# Patient Record
Sex: MALE | Race: WHITE | NOT HISPANIC OR LATINO | Employment: FULL TIME | URBAN - NONMETROPOLITAN AREA
[De-identification: names, ages, dates, MRNs, and addresses within clinical notes are randomized per-mention and may not be internally consistent; named-entity substitution may affect disease eponyms.]

---

## 2021-12-27 PROCEDURE — U0004 COV-19 TEST NON-CDC HGH THRU: HCPCS | Performed by: NURSE PRACTITIONER

## 2023-08-30 ENCOUNTER — OFFICE VISIT (OUTPATIENT)
Dept: INTERNAL MEDICINE | Facility: CLINIC | Age: 28
End: 2023-08-30
Payer: COMMERCIAL

## 2023-08-30 VITALS
HEIGHT: 66 IN | HEART RATE: 89 BPM | WEIGHT: 183 LBS | TEMPERATURE: 98.4 F | DIASTOLIC BLOOD PRESSURE: 68 MMHG | SYSTOLIC BLOOD PRESSURE: 112 MMHG | OXYGEN SATURATION: 100 % | BODY MASS INDEX: 29.41 KG/M2

## 2023-08-30 DIAGNOSIS — Z00.00 WELL ADULT EXAM: Primary | ICD-10-CM

## 2023-08-30 PROCEDURE — 99385 PREV VISIT NEW AGE 18-39: CPT | Performed by: FAMILY MEDICINE

## 2023-08-30 NOTE — PROGRESS NOTES
Michael Kennedy is a 27 y.o. male.    Chief Complaint   Patient presents with    Annual Exam     New patient - physical -        HPI   Michael Kennedy is a 27-year-old male who presents today for an annual physical exam and to establish care.     The patient believes he has received a tetanus vaccine within the last 10 years.  He has received the initial and follow-up COVID-19 injections. They were received on 06/2023 and 07/2023 because of his family reunion. He typically does not receive the influenza vaccine.  Once a year he may have a cigar for special occasions like he did last year after his wedding. He avoids drinking during the week and occasionally drinks on the weekend. 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 Eye exam was completed by a doctor in [place], vision was good, and he did not need any glasses. Dental exam was completed a couple of months ago. He tries to maintain a healthy diet. 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     The following portions of the patient's history were reviewed and updated as appropriate: allergies, current medications, past family history, past medical history, past social history, past surgical history and problem list.     History reviewed. No pertinent past medical history.    History reviewed. No pertinent surgical history.    Family History   Problem Relation Age of Onset    Cancer Mother         Follicular Lymphoma    Depression Mother     Cancer Father         Stomach Stage IV    Depression Sister     Lung cancer Maternal Grandmother        Social History     Socioeconomic History    Marital status:    Tobacco Use    Smoking status: Never     Passive exposure: Yes    Smokeless tobacco: Never    Tobacco comments:     Only one of two per year   Vaping Use    Vaping Use: Never used   Substance and Sexual Activity  "   Alcohol use: Yes     Alcohol/week: 15.0 standard drinks     Types: 3 Glasses of wine, 12 Drinks containing 0.5 oz of alcohol per week    Drug use: Never    Sexual activity: Yes     Partners: Female       No Known Allergies    No current outpatient medications on file.    ROS    Review of Systems   Constitutional:  Negative for chills, fatigue and fever.   HENT:  Negative for congestion, postnasal drip and sore throat.    Eyes:  Negative for blurred vision and visual disturbance.   Respiratory:  Negative for cough, shortness of breath and wheezing.    Cardiovascular:  Negative for chest pain and leg swelling.   Gastrointestinal:  Negative for abdominal pain, constipation, diarrhea, nausea and vomiting.   Endocrine: Negative for cold intolerance and heat intolerance.   Genitourinary:  Negative for dysuria and frequency.   Musculoskeletal:  Negative for arthralgias and back pain.   Skin:  Negative for color change and rash.   Allergic/Immunologic: Negative for environmental allergies.   Neurological:  Negative for weakness, numbness and headache.   Hematological:  Does not bruise/bleed easily.   Psychiatric/Behavioral:  Negative for depressed mood. The patient is not nervous/anxious.      Vitals:    08/30/23 0909   BP: 112/68   Pulse: 89   Temp: 98.4 øF (36.9 øC)   TempSrc: Oral   SpO2: 100%   Weight: 83 kg (183 lb)   Height: 167.6 cm (66\")   PainSc: 0-No pain     Body mass index is 29.54 kg/mý.    Physical Exam     Physical Exam  Constitutional:       General: He is not in acute distress.     Appearance: Normal appearance. He is well-developed.   HENT:      Head: Normocephalic and atraumatic.      Right Ear: Tympanic membrane and external ear normal.      Left Ear: Tympanic membrane and external ear normal.      Nose: Nose normal.      Mouth/Throat:      Pharynx: No posterior oropharyngeal erythema.      Comments: Postnasal drainage noted.  Eyes:      Extraocular Movements: Extraocular movements intact.      " Conjunctiva/sclera: Conjunctivae normal.      Pupils: Pupils are equal, round, and reactive to light.   Cardiovascular:      Rate and Rhythm: Normal rate and regular rhythm.      Heart sounds: No murmur heard.  Pulmonary:      Effort: Pulmonary effort is normal. No respiratory distress.      Breath sounds: Normal breath sounds. No wheezing.   Abdominal:      General: Bowel sounds are normal. There is no distension.      Palpations: Abdomen is soft.      Tenderness: There is no abdominal tenderness.   Musculoskeletal:      Cervical back: Neck supple.      Right lower leg: No edema.      Left lower leg: No edema.   Lymphadenopathy:      Cervical: No cervical adenopathy.   Skin:     General: Skin is warm and dry.   Neurological:      Mental Status: He is alert and oriented to person, place, and time.      Cranial Nerves: No cranial nerve deficit.   Psychiatric:         Mood and Affect: Mood normal.         Behavior: Behavior normal.       Assessment/Plan    Diagnoses and all orders for this visit:    1. Well adult exam (Primary)  Assessment & Plan:  Discussed with the patient routine health maintenance including vaccines, dental/eye health, healthy diet, and exercise. He reports receiving a tetanus vaccine within the last 10 years. We will try to obtain records of vaccines from his previous PCP. He is not due for any labs currently. Mental health has been addressed today as well.          No orders of the defined types were placed in this encounter.      No orders of the defined types were placed in this encounter.      Return in about 1 year (around 8/30/2024) for Annual.      Xena Denny DO  Transcribed from ambient dictation for Xena Denny DO by [place].  08/30/23   10:25 EDT    Patient or patient representative verbalized consent to the visit recording.  I have personally performed the services described in this document as transcribed by the above individual, and it is both accurate and  complete.

## 2023-08-30 NOTE — ASSESSMENT & PLAN NOTE
Discussed with the patient routine health maintenance including vaccines, dental/eye health, healthy diet, and exercise. He reports receiving a tetanus vaccine within the last 10 years. We will try to obtain records of vaccines from his previous PCP. He is not due for any labs currently. Mental health has been addressed today as well.

## 2023-12-13 ENCOUNTER — TRANSCRIBE ORDERS (OUTPATIENT)
Dept: PHYSICAL THERAPY | Facility: CLINIC | Age: 28
End: 2023-12-13
Payer: COMMERCIAL

## 2023-12-13 DIAGNOSIS — M25.571 PAIN IN RIGHT ANKLE AND JOINTS OF RIGHT FOOT: ICD-10-CM
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
## 2023-12-19 ENCOUNTER — TREATMENT (OUTPATIENT)
Dept: PHYSICAL THERAPY | Facility: CLINIC | Age: 28
End: 2023-12-19
Payer: OTHER MISCELLANEOUS

## 2023-12-19 DIAGNOSIS — M25.572 CHRONIC PAIN OF LEFT ANKLE: Primary | ICD-10-CM

## 2023-12-19 DIAGNOSIS — G89.29 CHRONIC PAIN OF LEFT ANKLE: Primary | ICD-10-CM

## 2023-12-19 DIAGNOSIS — S93.401D SPRAIN OF RIGHT ANKLE, UNSPECIFIED LIGAMENT, SUBSEQUENT ENCOUNTER: ICD-10-CM

## 2023-12-19 PROCEDURE — 97161 PT EVAL LOW COMPLEX 20 MIN: CPT | Performed by: PHYSICAL THERAPIST

## 2023-12-19 NOTE — PROGRESS NOTES
Physical Therapy Initial Evaluation and Plan of Care   [address] [place] [address]      Patient: Michael Kennedy   : 1995  Diagnosis/ICD-10 Code:  Chronic pain of left ankle [M25.572, G89.29]  Referring practitioner: Costa Hanley Jr.*    Subjective Evaluation    History of Present Illness  Date of onset: 2023  Mechanism of injury: Pt reports that he slipped and fell at work twisting the R ankle. Pt reports that he has been having pain in the ankle since and is working light duty. Pt reports that he has been having difficulty walking and turning the ankle to the left. Pt reports having an inversion ankle sprain. Pt reports that he has trouble pushing off the R ankle and turning the ankle inwards. Pt reports that he could only walk on his toes last week and has slowly improved.       Patient Occupation:  Pain  Current pain ratin  At worst pain ratin  Quality: sharp and dull ache  Relieving factors: support, medications and ice  Aggravating factors: ambulation, squatting, stairs and movement (running, turning,)  Progression: improved    Social Support  Lives in: multiple-level home    Hand dominance: right    Diagnostic Tests  X-ray: normal    Treatments  Previous treatment: medication  Patient Goals  Patient goals for therapy: decreased pain, increased motion and increased strength  Patient goal: Return to normal work activities.           Objective          Active Range of Motion   Left Ankle/Foot   Dorsiflexion (ke): 0 degrees   Plantar flexion: 65 degrees   Inversion: 38 degrees   Eversion: 14 degrees     Right Ankle/Foot   Plantar flexion: 44 degrees   Inversion: 19 degrees   Eversion: 10 degrees     Additional Active Range of Motion Details  10 degrees from neutral on R ankle    Swelling   Left Ankle/Foot   Figure 8: 54 cm    Right Ankle/Foot   Figure 8: 52 cm    Ambulation     Observational Gait   Gait: antalgic   Decreased walking speed and  stride length.   Right foot contact pattern: foot flat    Quality of Movement During Gait     Additional Quality of Movement During Gait Details  Pt ambulates with R hip externally rotated and has ankle ER to avoid heel strike and toe off.           Assessment & Plan       Assessment  Impairments: abnormal gait, abnormal muscle tone, abnormal or restricted ROM, activity intolerance, impaired balance, impaired physical strength, lacks appropriate home exercise program, pain with function and weight-bearing intolerance   Functional limitations: walking, uncomfortable because of pain, standing and stooping   Assessment details: Patient is a 28 year old male who comes to physical therapy with R chronic ankle pain. Signs and symptoms are consistent with R ankle sprain of the lateral ligaments resulting in pain, decreased ROM, decreased strength, and inability to perform all essential functional activities. Pt will benefit from skilled PT services to address the above issues.       Prognosis: good    Goals  Plan Goals: SHORT TERM GOALS:     2 weeks  1. Pt independent with HEP   2. Pt to demonstrate ability to ambulate in the clinic without antalgic gait   3. Pt to demonstrate ability to perform bilateral leg heel raise on the right without increase in pain      LONG TERM GOALS:   6 weeks  1. Pt to demonstrate ability to perform full functional squat with good form and no increase in pain in the right foot/ankle  2. Pt to demonstrate ability to ambulate on TM for 10 minutes with normal gait pattern without increase in pain  3. Pt to report being able to work full shift without increase in pain in the right ankle/foot  4. Pt to demonstrate ability to perform SLS on the right lower extremity for 30 seconds without LOB or increased pain         Plan  Therapy options: will be seen for skilled therapy services  Planned modality interventions: cryotherapy  Planned therapy interventions: balance/weight-bearing training, body  mechanics training, flexibility, functional ROM exercises, home exercise program, joint mobilization, gait training, manual therapy, neuromuscular re-education, motor coordination training, soft tissue mobilization, strengthening, stretching and therapeutic activities  Frequency: 2x week  Duration in weeks: 6  Treatment plan discussed with: patient        Timed Treatment:  Manual Therapy:         mins  28179;  Therapeutic Exercise:         mins  96830;     Neuromuscular Tyrell:        mins  11093;    Therapeutic Activity:          mins  15320;     Gait Training:           mins  11598;     Ultrasound:          mins  91048;    Electrical Stimulation:         mins  44805 ( );  Dry Needling          mins self-pay  Iontophoresis          mins 64956    Untimed Treatments:  Electrical Stimulation:         mins  41659 ( );  Dry Needling:                     mins  Ultrasound:                         mins  68126;      Timed Treatment:      mins   Total Treatment:     37   mins    PT SIGNATURE: Jose Roman PT   [place] License: [id number]  DATE TREATMENT INITIATED: 12/19/2023    Initial Certification  Certification Period: 3/17/2024  I certify that the therapy services are furnished while this patient is under my care.  The services outlined above are required by this patient, and will be reviewed every 90 days.     PHYSICIAN: Costa Hanley Jr., APRN      DATE:     Please sign and return via fax to [phone].. Thank you, [facility].

## 2024-01-03 ENCOUNTER — TELEPHONE (OUTPATIENT)
Dept: INTERNAL MEDICINE | Facility: CLINIC | Age: 29
End: 2024-01-03
Payer: COMMERCIAL

## 2024-01-03 RX ORDER — OSELTAMIVIR PHOSPHATE 75 MG/1
75 CAPSULE ORAL 2 TIMES DAILY
Qty: 10 CAPSULE | Refills: 0 | Status: SHIPPED | OUTPATIENT
Start: 2024-01-03

## 2024-01-03 NOTE — TELEPHONE ENCOUNTER
Caller: Michael Kennedy    Relationship: Self    Best call back number: [phone]    What medication are you requesting: TAMIFLU REQUEST    What are your current symptoms: TYPE A FLU; SENSITIVE TO COLD; HEADACHE; COUGHING    How long have you been experiencing symptoms: A WEEK    Have you had these symptoms before:    [x] Yes  [] No    Have you been treated for these symptoms before:   [] Yes  [x] No    If a prescription is needed, what is your preferred pharmacy and phone number:  [facility]    Additional notes: PATIENT DOES NOT FEEL COMFORTABLE COMING INTO OFFICE; EXPOSED TO TYPE A FLU FROM HOUSEHOLD; WOULD LIKE TO SEE IF PCP WOULD SEND IN TAMIFLU MEDICATION     PLEASE ADVISE

## 2024-01-04 NOTE — TELEPHONE ENCOUNTER
Patient notified of rx sent. States that when he didn't get a response yesterday he went to Urgent Care and was prescribed Tamiflu.

## 2024-01-04 NOTE — TELEPHONE ENCOUNTER
Tamiflu sent to the pharmacy.  Please let the patient know it is only effective if started within the first 48 hrs of symptoms.

## 2025-07-08 ENCOUNTER — OFFICE VISIT (OUTPATIENT)
Dept: INTERNAL MEDICINE | Facility: CLINIC | Age: 30
End: 2025-07-08
Payer: COMMERCIAL

## 2025-07-08 VITALS
TEMPERATURE: 98 F | SYSTOLIC BLOOD PRESSURE: 124 MMHG | BODY MASS INDEX: 31.18 KG/M2 | OXYGEN SATURATION: 97 % | DIASTOLIC BLOOD PRESSURE: 82 MMHG | WEIGHT: 194 LBS | HEART RATE: 88 BPM | HEIGHT: 66 IN

## 2025-07-08 DIAGNOSIS — F41.9 ANXIETY AND DEPRESSION: Primary | ICD-10-CM

## 2025-07-08 DIAGNOSIS — F41.0 PANIC ATTACK: ICD-10-CM

## 2025-07-08 DIAGNOSIS — F32.A ANXIETY AND DEPRESSION: Primary | ICD-10-CM

## 2025-07-08 RX ORDER — BUSPIRONE HYDROCHLORIDE 5 MG/1
5 TABLET ORAL 3 TIMES DAILY PRN
Qty: 180 TABLET | Refills: 1 | Status: SHIPPED | OUTPATIENT
Start: 2025-07-08

## 2025-07-08 RX ORDER — ESCITALOPRAM OXALATE 10 MG/1
10 TABLET ORAL DAILY
Qty: 90 TABLET | Refills: 3 | Status: SHIPPED | OUTPATIENT
Start: 2025-07-08

## 2025-07-08 NOTE — PROGRESS NOTES
Michael Kennedy is a 29 y.o. male.    Chief Complaint   Patient presents with    Depression    Anxiety    Panic Attack     Has been having panic attacks since 2019, but they are more often. Starts feeling numbness in his body and then can't focus. Hyperventilates.        HPI   History of Present Illness  The patient presents with anxiety and depression.    He experienced a severe panic attack this morning while driving, leading his wife to schedule this appointment. During the episode, he struggled to comprehend the time, hyperventilated, and experienced numbness in his extremities. His fingers assumed a fanning position and remained immobile for 20-25 minutes. He felt tightness and tension around his body and has been twitchy since. He attributes these episodes to recent grief over their dog and other stressors. Over the past two weeks, he has been experiencing panic attacks a couple of times a day, feeling nervous and worried. His sleep is inconsistent, often getting only 2.5-3 hours per night due to his dog's condition. He frequently feels sad, down, and irritable, with a short temper and poor short-term memory. 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 He is concerned about potential side effects of antidepressants, as his mother and sister have had adverse reactions. He has been drinking heavily for the past week or two, attributing it to self-medication. He is considering seeing a therapist or psychiatrist. He reports no abdominal tenderness but feels jittery. He has noticed increased irritability and fatigue over the past 3-5 years. He used to collect wine but stopped after their cat , feeling grief is contributing to his problems. He is curious about how he will feel once Lexapro starts working. He is concerned about his dog's health, diagnosed with myasthenia gravis and given a prognosis of six months to live three years ago. His panic attacks began intermittently in 2019 when  "his father fell ill and passed away in 2020. His mother was diagnosed with cancer three months after his father's death, coinciding with the onset of most of his panic attacks. He notes that recent attacks have been more severe.        The following portions of the patient's history were reviewed and updated as appropriate: allergies, current medications, past family history, past medical history, past social history, past surgical history and problem list.     No Known Allergies      Current Outpatient Medications:     busPIRone (BUSPAR) 5 MG tablet, Take 1 tablet by mouth 3 (Three) Times a Day As Needed (anxiety)., Disp: 180 tablet, Rfl: 1    escitalopram (Lexapro) 10 MG tablet, Take 1 tablet by mouth Daily., Disp: 90 tablet, Rfl: 3    ROS    Review of Systems   Constitutional:  Negative for chills and fever.   Respiratory:  Positive for shortness of breath.    Cardiovascular:  Negative for chest pain.   Neurological:  Positive for dizziness and confusion.   Psychiatric/Behavioral:  Positive for agitation, sleep disturbance, depressed mood and stress. The patient is nervous/anxious.        Vitals:    07/08/25 0951   BP: 124/82   Pulse: 88   Temp: 98 °F (36.7 °C)   SpO2: 97%   Weight: 88 kg (194 lb)   Height: 167.6 cm (66\")   PainSc: 0-No pain         Physical Exam     Physical Exam  Constitutional:       General: He is not in acute distress.     Appearance: He is well-developed.   HENT:      Head: Normocephalic and atraumatic.      Right Ear: External ear normal.      Left Ear: External ear normal.   Eyes:      Extraocular Movements: Extraocular movements intact.      Conjunctiva/sclera: Conjunctivae normal.   Cardiovascular:      Rate and Rhythm: Normal rate and regular rhythm.      Heart sounds: No murmur heard.  Pulmonary:      Effort: Pulmonary effort is normal. No respiratory distress.      Breath sounds: Normal breath sounds. No wheezing.   Abdominal:      General: Bowel sounds are normal. There is no " distension.      Palpations: Abdomen is soft.      Tenderness: There is no abdominal tenderness.   Skin:     General: Skin is warm and dry.   Neurological:      Mental Status: He is alert and oriented to person, place, and time.      Cranial Nerves: No cranial nerve deficit.   Psychiatric:         Mood and Affect: Mood is anxious.         Behavior: Behavior normal.             Diagnoses and all orders for this visit:    1. Anxiety and depression (Primary)    2. Panic attack    Other orders  -     busPIRone (BUSPAR) 5 MG tablet; Take 1 tablet by mouth 3 (Three) Times a Day As Needed (anxiety).  Dispense: 180 tablet; Refill: 1  -     escitalopram (Lexapro) 10 MG tablet; Take 1 tablet by mouth Daily.  Dispense: 90 tablet; Refill: 3        Assessment & Plan  1. Anxiety.  - Likely exacerbated by grief and stress.  - Lexapro 10 mg daily and BuSpar 10 mg 2-3 times daily prescribed.  - Counseling sessions with Johnnie beckman.    2. Depression.  - Lexapro 10 mg daily prescribed.  - May take 4-6 weeks for improvement.  - Discontinue medication and notify provider if symptoms worsen.    3. Panic attacks.  - Frequent panic attacks, particularly in the last two weeks.  - Lexapro 10 mg daily and BuSpar 10 mg 2-3 times daily prescribed.  - Monitor for severe side effects and report immediately.      New Medications Ordered This Visit   Medications    busPIRone (BUSPAR) 5 MG tablet     Sig: Take 1 tablet by mouth 3 (Three) Times a Day As Needed (anxiety).     Dispense:  180 tablet     Refill:  1    escitalopram (Lexapro) 10 MG tablet     Sig: Take 1 tablet by mouth Daily.     Dispense:  90 tablet     Refill:  3       No orders of the defined types were placed in this encounter.      Return for Next scheduled follow up.    Xena Denny DO

## 2025-07-23 ENCOUNTER — OFFICE VISIT (OUTPATIENT)
Age: 30
End: 2025-07-23
Payer: COMMERCIAL

## 2025-07-23 DIAGNOSIS — F43.22 ADJUSTMENT DISORDER WITH ANXIOUS MOOD: Primary | ICD-10-CM

## 2025-07-24 PROBLEM — F43.22 ADJUSTMENT DISORDER WITH ANXIOUS MOOD: Status: ACTIVE | Noted: 2025-07-24

## 2025-07-24 NOTE — PROGRESS NOTES
Initial Evaluation      Date Encounter: 2025   Name: Michael Kennedy  MRN: [id number]  : 1995    Time In: 15:34  Time Out: 16:27     Referring Provider: Xena Denny DO    Chief Complaint: (F43.22) Adjustment disorder with anxious mood     History of Present Illness:  Michael Kennedy is a 29 y.o. male who is being seen today for initial therapy evaluation.  Therapist provided informed consent to patient explaining that confidentiality cannot be maintained if patient states intent, thought or plan to harm themself, someone else or if someone had harmed or plans to harm them. Patient stated understanding and consented for treatment.  Patient arrived on time. Patient is dressed appropriately . Patient is here for initial appointment for mental health therapy. Patient reports that he has some suppressed feelings regarding his father's passing in , stress from work and home life. Patient reports having history of panic attacks and has recently started Lexapro. Patient denies any previous mental health outpatient or inpatient therapy. Patient denies any history of suicide attempts or self harming behaviors. Patient denies any suicidal thoughts, plans or intent.     Subjective     Assessment Scores:     PHQ-9 Depression Screening  Little interest or pleasure in doing things? Several days   Feeling down, depressed, or hopeless? Not at all   PHQ-2 Total Score 1   Trouble falling or staying asleep, or sleeping too much? Nearly every day (dog issues)   Feeling tired or having little energy? More than half the days   Poor appetite or overeating? Not at all   Feeling bad about yourself - or that you are a failure or have let yourself or your family down? Not at all   Trouble concentrating on things, such as reading the newspaper or watching television? Not at all   Moving or speaking so slowly that other people could have noticed? Or the opposite - being so fidgety or restless that you have been moving  around a lot more than usual? Not at all     Thoughts that you would be better off dead, or of hurting yourself in some way? Not at all   PHQ-9 Total Score 6   If you checked off any problems, how difficult have these problems made it for you to do your work, take care of things at home, or get along with other people? Somewhat difficult       RUTHANN-7  Feeling nervous, anxious or on edge: Several days  Not being able to stop or control worrying: Not at all  Worrying too much about different things: Nearly every day (work)  Trouble Relaxing: Nearly every day  Being so restless that it is hard to sit still: Not at all  Feeling afraid as if something awful might happen: Not at all  Becoming easily annoyed or irritable: Not at all  RUTHANN 7 Total Score: 7  If you checked any problems, how difficult have these problems made it for you to do your work, take care of things at home, or get along with other people: Somewhat difficult (very situational, based on work)    Patient's Support Network Includes:  wife, mother, and friend(s)    Patient Trauma/Abuse History: Patient denies any childhood trauma or abuse history. Patient does report sudden illness and death of his father March of 2020.       Work/Educational History:   Employment Status: Full time    Social History:  Current living situation: Lives with his wife, daughter, mother, sister and sister's boyfriend  Relationship with family members: Good  Difficulty making new/maintaining friendships: None reported        Mental/Behavioral Health History:   Previous Suicide Attempts: Patient denies any history or current attempts  Most Recent Attempt: N/A  Hx of Psychiatric or Detox Hospitalizations:  No  Most recent inpatient admission: N/A    Family Psychiatric History:  Patient reports mom and sister with depression. Patient reports generations in  and WWII, alcohol use common with them.     Legal History:   The patient has no significant history of legal  issues.    Substance Use History  Active Use: Patient reports occasional use of alcohol      Current Stressors: Work home life    Social History:   Social History     Socioeconomic History    Marital status:    Tobacco Use    Smoking status: Never     Passive exposure: Yes    Smokeless tobacco: Never    Tobacco comments:     Only one of two per year   Vaping Use    Vaping status: Never Used   Substance and Sexual Activity    Alcohol use: Yes     Alcohol/week: 15.0 standard drinks of alcohol     Types: 3 Glasses of wine, 12 Drinks containing 0.5 oz of alcohol per week    Drug use: Never    Sexual activity: Yes     Partners: Female        Past Medical History: No past medical history on file.    Past Surgical History: No past surgical history on file.    Family History:   Family History   Problem Relation Age of Onset    Cancer Mother         Follicular Lymphoma    Depression Mother     Cancer Father         Stomach Stage IV    Depression Sister     Lung cancer Maternal Grandmother        Medications:     Current Outpatient Medications:     busPIRone (BUSPAR) 5 MG tablet, Take 1 tablet by mouth 3 (Three) Times a Day As Needed (anxiety)., Disp: 180 tablet, Rfl: 1    escitalopram (Lexapro) 10 MG tablet, Take 1 tablet by mouth Daily., Disp: 90 tablet, Rfl: 3    Allergies:   No Known Allergies     Objective       MENTAL STATUS EXAM   General Appearance:  Cleanly groomed and dressed  Eye Contact:  Good eye contact  Attitude:  Cooperative  Motor Activity:  Normal gait, posture  Muscle Strength:  Normal  Speech:  Normal rate, tone, volume  Language:  Other  Other Comment:  Appropriate  Mood and affect:  Normal, pleasant  Hopelessness:  Denies  Loneliness: Denies  Thought Process:  Logical and goal-directed  Associations/ Thought Content:  No delusions  Hallucinations:  None  Suicidal Ideations:  Not present  Homicidal Ideation:  Not present  Sensorium:  Alert and clear  Orientation:  Person, place and time  Immediate  Recall, Recent, and Remote Memory:  Intact  Attention Span/ Concentration:  Good  Fund of Knowledge:  Appropriate for age and educational level  Intellectual Functioning:  Average range  Insight:  Good  Judgement:  Good  Reliability:  Good  Impulse Control:  Good     SUICIDE RISK ASSESSMENT/CSSRS  1. Does patient have thoughts of suicide? no  2. Does patient have intent for suicide? no  3. Does patient have a current plan for suicide? no  4. History of suicide attempts: no  5. Family history of suicide or attempts: no  6. History of violent behaviors towards others or property: no  7. History of sexual aggression toward others: no  8. Access to firearms or weapons: no    Assessment / Plan      Visit Diagnosis/Orders Placed This Visit:    ICD-10-CM ICD-9-CM   1. Adjustment disorder with anxious mood  F43.22 309.24        PLAN:     Prognosis: Good with ongoing treatment    Safety: Patient denies SI/HI.  Therapist and patient reviewed options for help including 914, 990 the suicide hotline, and going to the neared ER.  Therapist will continue to monitor.   Risk Assessment: Risk of self-harm acutely is low. Risk of self-harm chronically is also low, but could be further elevated in the event of treatment noncompliance and/or AODA.    Treatment Plan/Goals: Continue supportive psychotherapy efforts and medications as indicated. Treatment and medication options discussed during today's visit. Patient acknowledged and verbally consented to continue with current treatment plan and was educated on the importance of compliance with treatment and follow-up appointments. Patient seems reasonably able to adhere to treatment plan.      Assisted Patient in processing above session content; acknowledged and normalized patient’s thoughts, feelings, and concerns.     Allowed Patient to freely discuss issues  without interruption or judgement with unconditional positive regard, active listening skills, and empathy. Therapist provided a  safe, confidential environment to facilitate the development of a positive therapeutic relationship and encouraged open, honest communication. Assisted Patient in identifying risk factors which would indicate the need for higher level of care including thoughts to harm self or others and/or self-harming behavior and encouraged Patient to contact this office, call [phone] or present to the nearest emergency room should any of these events occur. Discussed crisis intervention services and means to access. Patient adamantly and convincingly denies current suicidal or homicidal ideation or perceptual disturbance. Assisted Patient in processing session content; acknowledged and normalized Patient’s thoughts, feelings, and concerns by utilizing a person-centered approach in efforts to build appropriate rapport and a positive therapeutic relationship with open and honest communication.     Follow Up:   Return in about 5 weeks (around 8/27/2025).      KATHRIN Sanchez   [facility]

## 2025-08-19 ENCOUNTER — OFFICE VISIT (OUTPATIENT)
Dept: INTERNAL MEDICINE | Facility: CLINIC | Age: 30
End: 2025-08-19
Payer: COMMERCIAL

## 2025-08-19 VITALS
BODY MASS INDEX: 32.14 KG/M2 | DIASTOLIC BLOOD PRESSURE: 82 MMHG | TEMPERATURE: 98 F | OXYGEN SATURATION: 99 % | HEART RATE: 74 BPM | WEIGHT: 200 LBS | SYSTOLIC BLOOD PRESSURE: 124 MMHG | HEIGHT: 66 IN

## 2025-08-19 DIAGNOSIS — M79.672 LEFT FOOT PAIN: ICD-10-CM

## 2025-08-19 DIAGNOSIS — Z13.220 LIPID SCREENING: ICD-10-CM

## 2025-08-19 DIAGNOSIS — R53.83 FATIGUE, UNSPECIFIED TYPE: ICD-10-CM

## 2025-08-19 DIAGNOSIS — F43.22 ADJUSTMENT DISORDER WITH ANXIOUS MOOD: ICD-10-CM

## 2025-08-19 DIAGNOSIS — Z00.00 WELL ADULT EXAM: Primary | ICD-10-CM

## 2025-08-19 PROCEDURE — 99395 PREV VISIT EST AGE 18-39: CPT | Performed by: FAMILY MEDICINE

## 2025-08-19 RX ORDER — MELOXICAM 7.5 MG/1
7.5 TABLET ORAL DAILY
Qty: 30 TABLET | Refills: 0 | Status: SHIPPED | OUTPATIENT
Start: 2025-08-19

## 2025-08-26 ENCOUNTER — OFFICE VISIT (OUTPATIENT)
Dept: BEHAVIORAL HEALTH | Facility: CLINIC | Age: 30
End: 2025-08-26
Payer: COMMERCIAL

## 2025-08-26 DIAGNOSIS — F43.22 ADJUSTMENT DISORDER WITH ANXIOUS MOOD: Primary | ICD-10-CM
